# Patient Record
Sex: MALE | Race: WHITE | NOT HISPANIC OR LATINO | Employment: OTHER | URBAN - METROPOLITAN AREA
[De-identification: names, ages, dates, MRNs, and addresses within clinical notes are randomized per-mention and may not be internally consistent; named-entity substitution may affect disease eponyms.]

---

## 2024-01-25 ENCOUNTER — TELEPHONE (OUTPATIENT)
Dept: TRANSPLANT | Facility: CLINIC | Age: 54
End: 2024-01-25

## 2024-01-25 NOTE — TELEPHONE ENCOUNTER
General    Reason for call: Yogi called to let Rupindre know that he received his forms in his email but he can't sign them using his phone. He will have access to a computer next week in which he will sign the forms then     Call back needed? Yes    Return Call Needed  Same as documented in contacts section  When to return call?: Greater than one day: Route standard priority

## 2024-01-29 ENCOUNTER — DOCUMENTATION ONLY (OUTPATIENT)
Dept: TRANSPLANT | Facility: CLINIC | Age: 54
End: 2024-01-29

## 2024-01-29 ENCOUNTER — TELEPHONE (OUTPATIENT)
Dept: TRANSPLANT | Facility: CLINIC | Age: 54
End: 2024-01-29

## 2024-01-29 NOTE — TELEPHONE ENCOUNTER
"Voucher: Wants More Info Registered As: Standard Voucher Donor  Donor Intake Start: 24 Donor Intake Complete: 24  Gender: Male Preferred Language: English  Full Name: zaire dixon Is  Needed: [not answered]  E-mail: [email or web address] Phone Number: [phone]  Secondary Phone:  Contact Preference: [not answered] Best Contact Time: 9am - 5pm  Emergency Contact: reza destiny Emergency Contact #: [id number]  Relationship to Contact: Contact is my parent  : 66 Age: 53  Address: [address] [place] [address] City: [place]  State: [place] Postal Code: [id number]  Height: 5'1\" Weight: 140lbs  BMI: 26.5  Employment Status: Retired Has PTO for donation? [not answered]  Occupation: [not answered] Requires Heavy Lifting? [not answered]  Education Level: GED Marital Status: Single  Exercise Routine: 2-3/Week Health Insurance: Yes  Blood Type: Unknown Ethnicity/Race: White  Donor Type: Standard Voucher Donor  Prefer Remote Donation: [not answered]  Physician: doctor rosen<br/>[facility] [place],  [place]  Donating for Recipient Transfer  Recipient's Name: hesham dixon Recipient's : [phone]  Recipient's Status: Patient on dialysis. How Candidate Knows Recip: Parent Of Patient  Candidate communicates w/  Recip: Every Day  Possible Interest In:  Motivation to donate: this is my son  Living Donor Pre-Screening  Is In [place]  Will accept blood transfusions? Yes  Has been diagnosed with kidney disease? No  Has had a heart attack? No  Has Diabetes (High BGs)? No  Has had cancer? No  Has had kidney stones? No  Has Used Tobacco? No  Has HIV? No  Is Currently Incarcerated? No  Is Currently Residing in [place] Yes  History Misc  Has Allergies? No  Has had Surgeries? Yes  Surgery When  hand fingers 1592  Takes Medication? No  Medical History  History of High BP? Never  History of CABG (bypass surgery)? No  History of blood clots? Never  History of coronary disease? Never  History of high cholesterol or triglycerides? " Never  Has stents implanted? No  History of chest pain during exercise? No  History of chest pain at other times? No  Results of climbing 2 flights of stairs? No Problem  Had stress test in last year? No  Has had stroke? No  Has had leg bypass? No  History of lung disease? Never  History of COPD? Never  History of TB? Never  History of Pneumonia? Never  Has respiratory issues? No  Has HIV? No  Has Gastro Issues? No  History of Gallstones? Never  History of Pancreatitis? Never  History of Liver Disease? Never  History of Hepatitis B? Never  History of Hepatitis C? Never  History of bleeding problems? Never  History of UTIs? No  History of kidney damage? Never  History of Proteinuria? Never  History of Hematuria? Never  History of neuro disease? Never  History of seizure? Never  History of lupus? Never  History of paralysis? Never  History of arthritis? Never  History of neuropathy? Never  History of depression? Never  History of anxiety? Never  History of documented psychiatric illness? Never  Has had transfusions? No  History of Obesity? No  History of Fabry's Disease? No  History of Sickle Cell Disease? No  History of Sickle Cell Trait? No  History of Sarcoidosis? No  Has auto-immune disease? No  Has had Physical Exam? Yes   - how many years ago: 2  Has had PSA Test? No  Colonoscopy? No  Medical history comments? no  Living Donor Family Medical History  Anyone with kidney disease? Yes   - which family members: neliscott dixon jamesbilly destiny  Anyone with liver disease? No  Anyone with heart disease? No  Anyone with coronary artery disease? No  Anyone with high blood pressure? No  Anyone with blood disorder? No  Anyone with cancer? No  Anyone with kidney cancer? No  Anyone with diabetes? No  Is mother alive? Yes  Mother's age? 78  Is father alive? Yes  Father's age? 77  How many siblings? 3  How many adult children? 5  How many children under 18? 0  Social History  Has Used Alcohol? No  Has Used Drugs? No  Has had legal  issues w/ law enforcement? Yes  Has been sentenced to FPC? No  Traveled over 100 miles from home in last year? No  Has had suicidal thoughts or attempts in the last five years? No

## 2024-01-31 ENCOUNTER — TELEPHONE (OUTPATIENT)
Dept: TRANSPLANT | Facility: CLINIC | Age: 54
End: 2024-01-31

## 2024-01-31 NOTE — TELEPHONE ENCOUNTER
Initial Independent Living Donor Advocate contact made with potential donor today.  I introduced myself and my role during the donation process, including:   FRANCISCO ROLE   The federal government requires that all licensed transplant centers provide the living donor with an Independent Living Donor Advocate (FRANCISCO).  I do not meet recipients or attend meetings that discuss their care or decision to transplant them. My role is separate to avoid any conflict of interest.  My role is to ensure:  1) your rights are protected;  2) you get all the information you need from the transplant team to make a fully informed decision whether to donate;   3) that living donation is in your best interest.   4) that you have the right to decide NOT to go forward with living donation at any time during this process.  I am available to you throughout the workup, during surgery phase and follow-up at home.     WORKUP & PRIVACY   Your identity and workup are not shared with the recipient at any time.   The recipient's insurance covers the medical expenses related to the donor evaluation and surgery.  However, it is important that you carry your own health insurance to address any medical issues that are found and are NOT related to living donation.  Additionally, age appropriate cancer screening (I.e. mammograms,  colonoscopies, etc) is required and would be through your insurance.  There is a psychosocial and medical donor workup that consists of testing to determine if you are healthy enough to donate. Workup tests include tissue typing/genetics, many blood draws, urine collection/ (kidney function testing), chest x-ray, EKG/other heart testing, CT scan. Age appropriate cancer screening is required and would be through your insurance. As you complete each step then you may move on to the next.  Workup can take as little or as long as you need and you can stop the process at any time. Transplant is a treatment option, not a cure. A kidney  from a living kidney donor can last 12-14 years.  Other treatment options are  donation and two types of dialysis.   This is major surgery and your estimated hospital stay is approximately 1-2 nights.  After surgery, there are driving and lifting restrictions - no driving for two weeks and no lifting over ten pounds for 8 weeks.  Donors are routinely off from work for 4 - 6 weeks after surgery, and potentially longer if they have a physical job.     If you anticipate lost wages due to donation, donor wage reimbursement options may be available to you and will be reviewed with you during the evaluation process. Donor Shield and NLDAC explained.  We reviewed the importance of completing follow-up labs and surveys at six months, 1 year and 2 years after donation to monitor kidney health and the impact donation has had on their life post donation.        QUESTIONS    Have you received the Welcome e-mail that includes copies of the informed consent, financial letter, information on donor shield and NLDAC from the transplant department? Yes.    Have you discussed with anyone your potential decision to donate?   Yes.    Is anyone pressuring or coercing you to donate? No.    Have you discussed any financial arrangements with recipient around donating a kidney? No.    Are you aware that you can confidentially opt out at any time, up to and including day of donation? Yes.    At this time, would you like to proceed with the medical evaluation to see if you can be a kidney donor?  Yes.    If yes, I will make an appointment for your donor coordinator to reach out to you with next steps.     Contact information for FRANCISCO's was provided Yes.    Sophie Pastor- [phone]  [facility]-  [phone]    Confirmed that Barbara Smith reviewed Informed consent document and all questions answered.  Reviewed that they will receive Docusign to obtain electronic signature for the following: Informed consent, SRTR data, NATALIE for  medical information, Auth for Electronic communication, Kidney for Life and will need their signed consent back before proceeding with evaluation.     Time frame for donation: tbd  Paired exchange was introduced  Yes.      MyChart was initiated  Yes.  CareEverywhere was initiated Yes.    FRANCISCO NOTES: Yogi Smith is highly motivated to donate a kidney to his son, Rashid.  Curt has received two past  donor kindeys already.  Yogi's other son has also had a transplant in the past.  He is employed and does work on a [employer] and would benefit from wage reimbursement.  I have him contact infor for donor SW to assist with travel expenses if needed.  Appt with Azul Rojas scheduled for 12:15PM on Monday, .  Email send with appt time and contact info for LISA Liang and FRANCISCO's.      Duration of call 40 minutes

## 2024-02-12 ENCOUNTER — TELEPHONE (OUTPATIENT)
Dept: TRANSPLANT | Facility: CLINIC | Age: 54
End: 2024-02-12

## 2024-02-12 NOTE — TELEPHONE ENCOUNTER
Spoke to Yogi and informed him that they can't move forward as a donor with being on long term dose of Warfarin due to Factor V Leiden and h/o DVT. Reviewed with Transplant team and they agreed it was too high risk.

## 2024-03-13 ENCOUNTER — DOCUMENTATION ONLY (OUTPATIENT)
Dept: TRANSPLANT | Facility: CLINIC | Age: 54
End: 2024-03-13

## 2024-03-13 NOTE — PROGRESS NOTES
Received a message from the recipient team that Yogi reached out saying he isn't on blood thinners anymore. I did hear from the recipient team as well that we aren't working up any donors at this time and that we need to hold off.

## 2024-10-18 ENCOUNTER — TELEPHONE (OUTPATIENT)
Dept: TRANSPLANT | Facility: CLINIC | Age: 54
End: 2024-10-18

## 2024-10-18 NOTE — TELEPHONE ENCOUNTER
Call from Laura méndez is his son.  He wanted to talk with honey coordinator  explained donors and recipients have their own team  and the coordinators work together with issues    Unable to consent due to medical condition

## 2025-01-03 ENCOUNTER — TELEPHONE (OUTPATIENT)
Dept: TRANSPLANT | Facility: CLINIC | Age: 55
End: 2025-01-03

## 2025-01-03 NOTE — TELEPHONE ENCOUNTER
Pt calling- he said he has been available to be a donor for his son for the past year and he is not being worked on.  His son is sitting in dialysis  and he would like to help his son,  Has tried to reach the coordinators and has not been called to see why he is not being moved on and getting tests done. Would like a call back today.

## 2025-02-11 ENCOUNTER — TELEPHONE (OUTPATIENT)
Dept: TRANSPLANT | Facility: CLINIC | Age: 55
End: 2025-02-11

## 2025-02-11 NOTE — TELEPHONE ENCOUNTER
Please call Dhruv,  He stated he is very interested in donating to his son and has not heard back from team yet.  PH# [phone]

## 2025-02-12 ENCOUNTER — TELEPHONE (OUTPATIENT)
Dept: TRANSPLANT | Facility: CLINIC | Age: 55
End: 2025-02-12

## 2025-02-12 NOTE — TELEPHONE ENCOUNTER
Called Yogi back to let him know we still have not received the go ahead to work up donors from recipient team. I reached out to the recipient team again this morning and nothing has changed so we are still on hold with donors.

## 2025-02-19 ENCOUNTER — TELEPHONE (OUTPATIENT)
Dept: TRANSPLANT | Facility: CLINIC | Age: 55
End: 2025-02-19

## 2025-02-19 NOTE — TELEPHONE ENCOUNTER
Yogi called this morning asking about information that was given to his son this morning. I told him I am not apart of his care and only care for him as the donor. I can't give out any information. He would need to have his son call his coordinator and they could do a 3 way call if he got permission to give out his sons medical information. I also told Yogi that we can't work up any donors yet because I have not received the okay from recipient team.